# Patient Record
Sex: MALE | Race: WHITE | NOT HISPANIC OR LATINO | URBAN - NONMETROPOLITAN AREA
[De-identification: names, ages, dates, MRNs, and addresses within clinical notes are randomized per-mention and may not be internally consistent; named-entity substitution may affect disease eponyms.]

---

## 2024-07-01 ENCOUNTER — OFFICE (OUTPATIENT)
Dept: URBAN - NONMETROPOLITAN AREA CLINIC 1 | Facility: CLINIC | Age: 64
End: 2024-07-01
Payer: COMMERCIAL

## 2024-07-01 VITALS
HEART RATE: 105 BPM | WEIGHT: 179 LBS | HEIGHT: 69 IN | SYSTOLIC BLOOD PRESSURE: 130 MMHG | DIASTOLIC BLOOD PRESSURE: 84 MMHG

## 2024-07-01 DIAGNOSIS — K22.10 ULCER OF ESOPHAGUS WITHOUT BLEEDING: ICD-10-CM

## 2024-07-01 DIAGNOSIS — R11.2 NAUSEA WITH VOMITING, UNSPECIFIED: ICD-10-CM

## 2024-07-01 DIAGNOSIS — R10.13 EPIGASTRIC PAIN: ICD-10-CM

## 2024-07-01 DIAGNOSIS — Z72.0 TOBACCO USE: ICD-10-CM

## 2024-07-01 PROCEDURE — 99214 OFFICE O/P EST MOD 30 MIN: CPT | Performed by: NURSE PRACTITIONER

## 2024-07-01 PROCEDURE — 36415 COLL VENOUS BLD VENIPUNCTURE: CPT | Performed by: NURSE PRACTITIONER

## 2024-07-01 RX ORDER — PANTOPRAZOLE SODIUM 40 MG/1
80 TABLET, DELAYED RELEASE ORAL
Qty: 60 | Refills: 3 | Status: ACTIVE
Start: 2024-07-01

## 2024-07-01 RX ORDER — SUCRALFATE 1 G/1
TABLET ORAL
Qty: 120 | Refills: 0 | Status: ACTIVE
Start: 2024-07-01

## 2024-07-01 NOTE — SERVICENOTES
Risks of procedure explained to patient he wishes to proceed 
EGD for surveillance of mucosal healing secondary to severe grade 4 erosive esophagitis found on EGD while inpatient 04/2024.  Patient has been without PPI therapy for 3-4 weeks and has signs and symptoms have returned.
Labs as above patient complains of nausea vomiting, epigastric pain
Abdominal ultrasound- gallbladder workup for the above 
We will start patient back on Protonix 40 mg p.o. b.i.d. and Carafate 1 g p.o. q.i.d. as prescribed above for erosive esophagitis and GERD
Avoid all NSAIDs and alcohol 
Smoking cessation discussed 
Patient denies any melena, hematochezia, hematocrit emesis and denies any shortness of breath, chest pain, or heart palpitations.  Advised patient if signs and symptoms do not improve and/or worsen go to the ER immediately
Patient has never had a screening colonoscopy -discussed with the patient and he will return to clinic in 3 months for consult.

## 2024-07-01 NOTE — SERVICEHPINOTES
Patient with history of arthritis, chest pain, CAD, COPD, diabetes, GERD, high cholesterol, poor circulation presents to the clinic today with his wife for hospital follow up of erosive grade 4 esophagitis on 4/27/24.  [place] [facility] [place]  Due to atypical chest pain and painful swallowing CT abdomen and pelvis obtained that revealed wall thickening of the distal esophagus.  GI was consulted and patient underwent EGD by Dr. Forte outlined below that revealed severe grade 4 erosive reflux esophagitis and a small hiatal hernia.  He was placed on pantoprazole 40 mg p.o. b.i.d. and Reglan 10 mg p.o. q.d..  Recommendations were to follow up in the GI clinic and repeat EGD in 2-3 months and maintain PPI therapy lifelong.  He reports pantoprazole prescription ran out and he has been without the medication for 3 weeks.  He staes he was doing really well s/p hospital while taking ppi bid, was able to eat and drink per ususal without pain for GERD.  Since he has not had protonix in 3 weeks and has been experiencing epigastric pain, n/v.  He c/o of GERD, "burning like fire,"-points to epigastric area, and painful to swallow.  Even with drinking water or little bits of food to eat he will vomit and spit bile.  Denies NSAID use.  Smokes 1ppd but lately has not been able to smoke as often.  Denies hematemesis, melena, or hematochezia.  He is not taking anything otc for GERD or the pain.  He c/o worse epigastric pain postprandially and with swallowing.  Denies oxygenation use, ckd, cardiac stents, or anticoagulation therapy.  br 
brLabs at d/c 4/30/24- hgb 15, hct 43. 
br
br CT AP w/ contrast 4/2024
brFINDINGS:brLung bases: Mild dependent atelectasis at the lung bases. Very small brbilateral pleural effusions.brLiver: Normal.brGallbladder/bile ducts: Normal. No biliary ductal dilatation.brPancreas: Normal.brSpleen: Normal.brAdrenal glands: There is mild thickening of the left adrenal without bra dominant nodule. The right adrenal is unremarkable.brKidneys/ureters/bladder: There is a 16 mm cyst at the interpolar brregion left kidney. The kidneys enhance symmetrically. No brhydronephrosis. Both ureters have a normal course and caliber. The brurinary bladder is unremarkable.brBowel: Mild concentric wall thickening is suspected within the distal bresophagus. The stomach is unremarkable. The small bowel is not brdilated. The appendix is normal. Left colon diverticulosis without brdiverticulitis. There is a large amount of stool throughout the brcolon.brPeritoneum: No free air or free fluid. No inflammatory changes.brLymph nodes: No lymphadenopathy.brReproductive: There appears to be a very large left hydrocele. brCorrelate clinically.brVasculature: Aorta is normal in caliber.brBones/soft tissues: There is a fluid containing right inguinal hernia.brIMPRESSION:brMild concentric wall thickening of the distal esophagus. Correlate brfor symptoms of esophagitis and gastroesophageal reflux.brA very large left hydrocele is suspected.brThere is a fluid containing right inguinal hernia. No bowel brherniation.brNo other acute pathology.   EGD by Dr. Forte 4/2024brFindings:brEsophagus:  Z-line = 40 cm. Circumferential exudate and erosion involving the entire esophagus. Only the proximal 2 to 3 centimeters of the esophagus are normal. There are no mass stricture, or varices.brGE Junction: 1 to 2 centimeter hiatal hernia otherwise normal with no varices or massbrStomach:  Normal with no ulcer, mass, or erosion.brPylorus:  Normal and patentbrDuodenum:  Normal with no ulcer, duodenitis, mass, AVM, or stricture.brEstimated blood loss: NonebrUnplanned events: There were no unplanned events.Postoperative diagnosis: brSevere grade erosive 4 reflux esophagitisbrSmall hiatal herniabrRecommendations: brContinue aggressive anti-reflux measuresbrContinue Protonix 40 milligrams b.i.d. for approximally 3 monthsbrContinue Reglan 10 milligrams b.i.d.brAdvance diet as tolerated
brFollow-up in the GI clinic in 2 to 3 months. Consider repeat endoscopy at that time to confirm healing of the esophagus. br 
br
[place] [facility] [place]per Dr. Pedersen of Present Illness br64-year-old male with history of arthritis, chest pain, CAD, COPD, diabetes, GERD, high cholesterol, poor circulation, and SOB presents to ED from Dr. Farrell's office for complaint of crampy, burning mid-gastric abdominal pain. Onset was 3 days ago. Associated symptoms include nausea, vomiting, and hyperglycemia. Patient has not taken any diabetic medications since 2019. In the ED, patient met SIRS criteria with tachycardia, tachypnea, leukocytosis, and lactic acid 5.6. Patient was given IV fluid boluses and Rocephin. Patient's blood glucose was greater than 500 upon admission to ED, but improved after fluids to 370 then 289. Insulin held due to potassium of 3.2. GI cocktail offered, but patient denied having any abdominal pain. Patient reports that he had a ruptured gallbladder several months prior that had never been addressed. EDP states patient ha no RUQ tenderness. Abnormal labs include UA ph 7.5, glucose >=1000 pH 7.49, HCO3 33, PO2 107, O2 sat 94%, base excess 9.4 sodium 128, potassium 3.2, chloride 84, CO2 31, glucose 543, A1C 11.2, BUN 35, lactic acid 5.6, magnesium 2.5, WBC 18.4, neutrophilic seg 79%, and lymphocytes 11%. EKG shows sinus tachycardia rate of 112 atrial premature complex biatrial enlargement. XR abdomen findings: Moderate to severe proximal colon constipation. CXR impression mild COPD. CT abdomen/pelvis impression Mild concentric wall thickening of the distal esophagus. Correlate for symptoms of esophagitis and gastroesophageal reflux. A very large left hydrocele is suspected. There is a fluid containing right inguinal hernia. No bowel herniation.brOn evaluation, patient tells me he has been having constant epigastric burning for the last 2 months with odynophagia. He was on ranitidine, but had stopped taking it "a few years ago". He does take meloxicam and Norco daily. He also reports some occasional constipation, typically resolved at home with stool softeners and eating salads. He endorses nausea and vomiting for 2 days prior to admission, which has resolved currently. Denies any hematemesis, hematochezia, or melena. No anticoagulants. No previous GI history or procedures. Smokes 1/2 PPD. Denies any alcohol or drug use. His epigastric pain and pyrosis is improving after starting Protonix in the hospital.brLabs: Na 136, hgb 13.7br
br CT abd/pelvis: IMPRESSION:brMild concentric wall thickening of the distal esophagus. Correlate for symptoms of esophagitis and gastroesophageal reflux.brA very large left hydrocele is suspected.brThere is a fluid containing right inguinal hernia. No bowel herniation.brNormal liver, gallbladder, pancreas, and bile ductsbrNo other acute pathology.KUB: IMPRESSION:brModerate to severe proximal colon constipation.brPMH: arthritis, chest pain, CAD, COPD, diabetes, GERD, high cholesterol, poor circulation, and SOBbrNSAIDs/ Anticoagulants: Takes meloxicam daily. No anticoagulants.brSocial history: Smokes 1/2 PPD. Denies any alcohol or drug use. brGI history:brNo family history of colorectal cancers.brNo previous GI history or procedures. 
br
brImpression and Plan brSevere pyrosis, epigastric pain, atypical chest pain most likely secondary to severe reflux esophagitis exacerbated by poorly controlled diabetes and possible mild gastroparesis. Currently has no obvious evidence for gallbladder disease although valvular disease has not been excluded. Need to exclude esophageal neoplasm and meloxicam induced peptic ulcer diseasebrPoorly controlled diabetes related to medical noncompliance. History of peripheral vascular diseasebrAverage risk colon screening [id number] deficiency most consistent with pernicious anemiabrNo signs of unstable angina or active ischemic heart disease.Agree with excellent management. brContinue IV Protonix.brAgree with metformin and insulin to control his hyperglycemia and diabetes.brBegin B12 injectionsbrConsider adding low-dose Reglan.Linette instructed the patient on anti-reflux measures and the fact that he may need lifelong dose of a daily proton pump inhibitor to control his reflux symptoms.brRecommend EGD tomorrow for further evaluation and consider a gallbladder ultrasound.brThe risks, benefits, and alternatives of the above procedure(s) has been discussed including but not limited to the risk of perforation, hemorrhage, infection, medication reaction, respiratory failure, incomplete evaluation, and death. The patient agrees to proceed with the above plan. Further recommendations will follow.   br
br

## 2024-07-03 ENCOUNTER — OFFICE (OUTPATIENT)
Dept: URBAN - NONMETROPOLITAN AREA CLINIC 1 | Facility: CLINIC | Age: 64
End: 2024-07-03

## 2024-07-03 VITALS — HEIGHT: 69 IN

## 2024-07-03 DIAGNOSIS — R11.2 NAUSEA WITH VOMITING, UNSPECIFIED: ICD-10-CM

## 2024-07-03 DIAGNOSIS — R10.13 EPIGASTRIC PAIN: ICD-10-CM

## 2024-07-03 PROCEDURE — 76700 US EXAM ABDOM COMPLETE: CPT | Mod: TC | Performed by: NURSE PRACTITIONER
